# Patient Record
Sex: MALE | Race: WHITE
[De-identification: names, ages, dates, MRNs, and addresses within clinical notes are randomized per-mention and may not be internally consistent; named-entity substitution may affect disease eponyms.]

---

## 2018-12-30 ENCOUNTER — HOSPITAL ENCOUNTER (EMERGENCY)
Dept: HOSPITAL 25 - UCCORT | Age: 28
Discharge: HOME | End: 2018-12-30
Payer: SELF-PAY

## 2018-12-30 VITALS — SYSTOLIC BLOOD PRESSURE: 140 MMHG | DIASTOLIC BLOOD PRESSURE: 92 MMHG

## 2018-12-30 DIAGNOSIS — R03.0: ICD-10-CM

## 2018-12-30 DIAGNOSIS — K04.7: Primary | ICD-10-CM

## 2018-12-30 DIAGNOSIS — F17.210: ICD-10-CM

## 2018-12-30 PROCEDURE — 99202 OFFICE O/P NEW SF 15 MIN: CPT

## 2018-12-30 PROCEDURE — G0463 HOSPITAL OUTPT CLINIC VISIT: HCPCS

## 2018-12-30 NOTE — UC
Dental HPI





- HPI Summary


HPI Summary: 





The patient is a 28-year-old male with a right upper toothache 24 hours.  

Today the pain has markedly increased.  He has right facial swelling.  The area 

in question is where he had a root canal done years ago.  He denies any fever 

or chills.  He denies any history of diabetes or heart murmur.





- History of Current Complaint


Chief Complaint: UCDentalProblem


Stated Complaint: DENTAL/ORAL COMPLAINT


Time Seen by Provider: 12/30/18 16:08


Hx Obtained From: Patient


Onset/Duration: Gradual Onset, Lasting Hours


Severity: Severe


Pain Intensity: 9 - declines analgesic 


Pain Scale Used: 0-10 Numeric


Aggravating Factor(s): Heat, Cold, Chewing


Alleviating Factor(s): OTC Meds


Related History: Previous Dental Care on Same Tooth, Swelling


Dental: 


  __________________________














  __________________________





 1 - abscess








- Allergies/Home Medications


Allergies/Adverse Reactions: 


 Allergies











Allergy/AdvReac Type Severity Reaction Status Date / Time


 


No Known Allergies Allergy   Verified 12/30/18 16:18














PMH/Surg Hx/FS Hx/Imm Hx


Previously Healthy: Yes





- Surgical History


Surgical History: None





- Family History


Known Family History: Positive: Hypertension





- Social History


Alcohol Use: Occasionally


Substance Use Type: None


Smoking Status (MU): Heavy Every Day Tobacco Smoker


Type: Cigarettes


Amount Used/How Often: 1 PPD





Review of Systems


All Other Systems Reviewed And Are Negative: Yes


Constitutional: Positive: Negative


Skin: Positive: Negative


Eyes: Positive: Negative


ENT: Positive: Dental Pain


Respiratory: Positive: Negative


Cardiovascular: Positive: Negative


Gastrointestinal: Positive: Negative


Genitourinary: Positive: Negative


Motor: Positive: Negative


Neurovascular: Positive: Negative


Musculoskeletal: Positive: Negative


Neurological: Positive: Negative


Psychological: Positive: Negative





Physical Exam


Triage Information Reviewed: Yes


Appearance: Well-Appearing, No Pain Distress, Well-Nourished


Vital Signs: 


 Initial Vital Signs











Temp  98.9 F   12/30/18 16:18


 


Pulse  71   12/30/18 16:18


 


Resp  17   12/30/18 16:18


 


BP  140/92   12/30/18 16:18


 


Pulse Ox  100   12/30/18 16:18











Vital Signs Reviewed: Yes


Eyes: Positive: Conjunctiva Clear


Dental: Positive: Abscess @


Neck: Positive: Supple, Nontender, No Lymphadenopathy


Respiratory: Positive: Lungs clear, Normal breath sounds, No respiratory 

distress, No accessory muscle use


Cardiovascular: Positive: RRR, No Murmur


Musculoskeletal: Positive: ROM Intact, No Edema


Neurological: Positive: Alert


Psychological Exam: Normal


Skin Exam: Normal





Dental Complaint Course/Dx





- Differential Dx/Diagnosis


Provider Diagnosis: 


 Dental abscess, Elevated blood pressure reading in office without diagnosis of 

hypertension








Discharge





- Sign-Out/Discharge


Documenting (check all that apply): Patient Departure


All imaging exams completed and their final reports reviewed: No Studies





- Discharge Plan


Condition: Stable


Disposition: HOME


Patient Education Materials:  Dental Abscess (ED)


Referrals: 


No Primary Care Phys,NOPCP [Primary Care Provider] - 


Additional Instructions: 


heat


aleve 2 twice daily with food


tylenol


recheck for new or worsening symptoms





see dentist first available appt





- Billing Disposition and Condition


Condition: STABLE


Disposition: Home

## 2020-03-01 ENCOUNTER — HOSPITAL ENCOUNTER (EMERGENCY)
Dept: HOSPITAL 25 - UCCORT | Age: 30
Discharge: HOME | End: 2020-03-01
Payer: COMMERCIAL

## 2020-03-01 VITALS — SYSTOLIC BLOOD PRESSURE: 132 MMHG | DIASTOLIC BLOOD PRESSURE: 82 MMHG

## 2020-03-01 DIAGNOSIS — H66.91: Primary | ICD-10-CM

## 2020-03-01 DIAGNOSIS — J06.9: ICD-10-CM

## 2020-03-01 PROCEDURE — G0463 HOSPITAL OUTPT CLINIC VISIT: HCPCS

## 2020-03-01 PROCEDURE — 99212 OFFICE O/P EST SF 10 MIN: CPT

## 2020-03-01 NOTE — UC
Respiratory Complaint HPI





- HPI Summary


HPI Summary: 





30 year old male presents with 1 week history of cough and congestion. 

Developed right ear pain yesterday. Did note chills but did not check his temp. 

DayQuil provided some relief. 





- History of Current Complaint


Stated Complaint: CONGESTION, COUGH, EAR PAIN


Time Seen by Provider: 03/01/20 07:15


Hx Obtained From: Patient


Onset/Duration: Gradual Onset, Lasting Weeks - one


Associated Signs And Symptoms: Positive: Chills, URI, Nasal Congestion.  

Negative: Dyspnea, Fever, Pleuritic Chest Pain, Wheezing, Hemoptysis





- Allergies/Home Medications


Allergies/Adverse Reactions: 


 Allergies











Allergy/AdvReac Type Severity Reaction Status Date / Time


 


No Known Allergies Allergy   Verified 03/01/20 07:17











Home Medications: 


 Home Medications





Amoxicillin PO (*) [Amoxicillin 500 MG CAP*] 500 mg PO TID 10 Days #30 cap 03/01 /20 [Rx]


D-Methorphan/PE/Acetaminophen [Daytime Cold Multi-Symp Gelcap] 1 dose PO ONCE 03 /01/20 [History Confirmed 03/01/20]











PMH/Surg Hx/FS Hx/Imm Hx


Previously Healthy: No





- Surgical History


Surgical History: None





- Family History


Known Family History: Positive: Hypertension


Family History: brother with asthma





- Social History


Alcohol Use: Occasionally


Substance Use Type: None


Smoking Status (MU): Heavy Every Day Tobacco Smoker


Type: Cigarettes


Amount Used/How Often: 1 PPD





Review of Systems


All Other Systems Reviewed And Are Negative: Yes


Constitutional: Positive: Chills


Skin: Positive: Negative


Eyes: Positive: Negative


ENT: Positive: Ear Ache - right x 1 day, Nasal Discharge, Sinus Congestion.  

Negative: Sore Throat, Sinus Pain/Tenderness


Respiratory: Positive: Cough.  Negative: Shortness Of Breath


Cardiovascular: Negative: Palpitations, Chest Pain


Gastrointestinal: Negative: Abdominal Pain, Vomiting, Diarrhea, Nausea


Genitourinary: Positive: Negative


Motor: Positive: Negative


Neurovascular: Positive: Negative


Musculoskeletal: Positive: Negative


Neurological/Mental Status: Positive: Negative


Psychological: Positive: Negative


Is Patient Immunocompromised?: No





Physical Exam


Triage Information Reviewed: Yes


Appearance: Well-Appearing


Vital Signs Reviewed: Yes


Eye Exam: Normal


ENT: Positive: Pharynx normal, Nasal congestion, TM bulging - right, left normal

, TM red - right, left normal, Uvula midline.  Negative: Tonsillar swelling, 

Tonsillar exudate, Sinus tenderness


Neck: Positive: Supple, No Lymphadenopathy


Respiratory: Positive: Lungs clear, Normal breath sounds.  Negative: Crackles, 

Rhonchi, Wheezing


Cardiovascular: Positive: RRR, No Murmur


Abdomen Description: Positive: Nontender, Soft


Musculoskeletal Exam: Normal


Neurological Exam: Normal


Psychological Exam: Normal


Skin Exam: Normal





Respiratory Course/Dx





- Differential Dx/Diagnosis


Differential Diagnosis/HQI/PQRI: Bronchitis, Sinusitis


Provider Diagnosis: 


 Right otitis media, Upper respiratory infection








Discharge ED





- Sign-Out/Discharge


Documenting (check all that apply): Patient Departure


All imaging exams completed and their final reports reviewed: No Studies





- Discharge Plan


Condition: Stable


Disposition: HOME


Prescriptions: 


Amoxicillin PO (*) [Amoxicillin 500 MG CAP*] 500 mg PO TID 10 Days #30 cap


Patient Education Materials:  Ear Infection (ED)


Referrals: 


No Primary Care Phys,NOPCP [Primary Care Provider] - 


Additional Instructions: 


Drink plenty of fluids, take Tylenol or ibuprofen over the counter as needed 

for pain. Take all amoxicillin as prescribed. Follow-up with your Primary Care 

Physician if your symptoms persist or worsen. 





- Billing Disposition and Condition


Condition: STABLE


Disposition: Home